# Patient Record
Sex: FEMALE | Race: WHITE | NOT HISPANIC OR LATINO | URBAN - METROPOLITAN AREA
[De-identification: names, ages, dates, MRNs, and addresses within clinical notes are randomized per-mention and may not be internally consistent; named-entity substitution may affect disease eponyms.]

---

## 2017-12-15 ENCOUNTER — INPATIENT (INPATIENT)
Facility: HOSPITAL | Age: 67
LOS: 3 days | Discharge: NURSING, REHAB OR LONG-TERM CARE | DRG: 86 | End: 2017-12-19
Attending: SURGERY | Admitting: SURGERY
Payer: MEDICARE

## 2017-12-15 VITALS — TEMPERATURE: 99 F | OXYGEN SATURATION: 98 % | RESPIRATION RATE: 16 BRPM | HEART RATE: 94 BPM

## 2017-12-15 LAB
APTT BLD: 37.1 SEC — SIGNIFICANT CHANGE UP (ref 27.5–37.4)
BASOPHILS # BLD AUTO: 0 K/UL — SIGNIFICANT CHANGE UP (ref 0–0.2)
BASOPHILS NFR BLD AUTO: 0.3 % — SIGNIFICANT CHANGE UP (ref 0–2)
EOSINOPHIL # BLD AUTO: 0 K/UL — SIGNIFICANT CHANGE UP (ref 0–0.5)
EOSINOPHIL NFR BLD AUTO: 0.6 % — SIGNIFICANT CHANGE UP (ref 0–6)
HCT VFR BLD CALC: 36.1 % — SIGNIFICANT CHANGE UP (ref 34.5–45)
HGB BLD-MCNC: 12.3 G/DL — SIGNIFICANT CHANGE UP (ref 11.5–15.5)
INR BLD: 1.65 RATIO — HIGH (ref 0.88–1.16)
LYMPHOCYTES # BLD AUTO: 1.3 K/UL — SIGNIFICANT CHANGE UP (ref 1–3.3)
LYMPHOCYTES # BLD AUTO: 24 % — SIGNIFICANT CHANGE UP (ref 13–44)
MCHC RBC-ENTMCNC: 32.9 PG — SIGNIFICANT CHANGE UP (ref 27–34)
MCHC RBC-ENTMCNC: 34 GM/DL — SIGNIFICANT CHANGE UP (ref 32–36)
MCV RBC AUTO: 96.8 FL — SIGNIFICANT CHANGE UP (ref 80–100)
MONOCYTES # BLD AUTO: 0.5 K/UL — SIGNIFICANT CHANGE UP (ref 0–0.9)
MONOCYTES NFR BLD AUTO: 9.7 % — SIGNIFICANT CHANGE UP (ref 2–14)
NEUTROPHILS # BLD AUTO: 3.6 K/UL — SIGNIFICANT CHANGE UP (ref 1.8–7.4)
NEUTROPHILS NFR BLD AUTO: 65.4 % — SIGNIFICANT CHANGE UP (ref 43–77)
PLATELET # BLD AUTO: 221 K/UL — SIGNIFICANT CHANGE UP (ref 150–400)
PROTHROM AB SERPL-ACNC: 18 SEC — HIGH (ref 9.8–12.7)
RBC # BLD: 3.73 M/UL — LOW (ref 3.8–5.2)
RBC # FLD: 11.2 % — SIGNIFICANT CHANGE UP (ref 10.3–14.5)
WBC # BLD: 5.5 K/UL — SIGNIFICANT CHANGE UP (ref 3.8–10.5)
WBC # FLD AUTO: 5.5 K/UL — SIGNIFICANT CHANGE UP (ref 3.8–10.5)

## 2017-12-15 PROCEDURE — 93010 ELECTROCARDIOGRAM REPORT: CPT

## 2017-12-15 PROCEDURE — 72125 CT NECK SPINE W/O DYE: CPT | Mod: 26

## 2017-12-15 PROCEDURE — 99285 EMERGENCY DEPT VISIT HI MDM: CPT | Mod: 25,GC

## 2017-12-15 PROCEDURE — 70450 CT HEAD/BRAIN W/O DYE: CPT | Mod: 26

## 2017-12-15 NOTE — ED ADULT NURSE NOTE - OBJECTIVE STATEMENT
67y female BIBEMS s/p fall. pt is alert and oriented x 3 with periods of confusion. Pt has hx of a-fib on eliquis and asa and dementia. As per EMS pt fell two times tonight while at the [facility] [facility]. Pt states she felt weak prior to fall. Pt states she hit her head, denies LOC. Hematoma felt on back of pt occiput. Pt denies ha, cp, sob, fevers, chills, n/v/d. Pt has no complaints in ER at this time. Pt attached to CM. MD jang completed. Pt in NAD. respirations equal and regular. Will reassess.

## 2017-12-15 NOTE — ED PROVIDER NOTE - MEDICAL DECISION MAKING DETAILS
Ron MCWILLIAMS: 66 y/o female with hx of Dementia and A fib on ASA and Eliquis here from NH after fall. Patient is A/OX3 but unable to provide a sound history. Patient reports she had a mechanical fall. NH reports fall was unwitnessed. Patient walks unassited at baseline. No melena, CP, palpitations, BRBPR or recent illness reported. Exam shows a female in NAD, perseverating, with clear lungs and abd soft and nontender and no midline lumbar or cervical posterior tenderness. Consider Cardiac arrhythmia, electrolyte abnormality, CAP, UTI, Mechanical fall. Also eval for ICH. Plan CBC, CMP, UA, CT head and neck and reassess.

## 2017-12-15 NOTE — ED PROVIDER NOTE - ATTENDING CONTRIBUTION TO CARE
Attending MD Jerez:  I personally have seen and examined this patient.  Resident note reviewed and agree on plan of care and except where noted.  See MDM for details.

## 2017-12-15 NOTE — ED PROVIDER NOTE - PROGRESS NOTE DETAILS
Colt MCWILLIAMS: Rhythm strip noted to have three events of nonsustained polymorphic V tach. Patient reassessed and reports no new symptoms. Cardiacs and electrolytes added. Another EKG to be ordered. Ron MCWILLIAMS: Bilateral SDH on CT head. Neurosurgery called and patient to be admitted. MD oMr: seen by nsx recommending repeat scan in AM, hold reversal of anticoagulation given exam. MD Mor: seen by brandon villegas recommending repeat scan in AM, hold reversal of anticoagulation given exam. message left for son Ron Irvin.

## 2017-12-16 DIAGNOSIS — Z90.89 ACQUIRED ABSENCE OF OTHER ORGANS: Chronic | ICD-10-CM

## 2017-12-16 DIAGNOSIS — Z90.49 ACQUIRED ABSENCE OF OTHER SPECIFIED PARTS OF DIGESTIVE TRACT: Chronic | ICD-10-CM

## 2017-12-16 DIAGNOSIS — I60.9 NONTRAUMATIC SUBARACHNOID HEMORRHAGE, UNSPECIFIED: ICD-10-CM

## 2017-12-16 LAB
ALBUMIN SERPL ELPH-MCNC: 4.1 G/DL — SIGNIFICANT CHANGE UP (ref 3.3–5)
ALP SERPL-CCNC: 57 U/L — SIGNIFICANT CHANGE UP (ref 40–120)
ALT FLD-CCNC: 15 U/L RC — SIGNIFICANT CHANGE UP (ref 10–45)
ANION GAP SERPL CALC-SCNC: 10 MMOL/L — SIGNIFICANT CHANGE UP (ref 5–17)
ANION GAP SERPL CALC-SCNC: 13 MMOL/L — SIGNIFICANT CHANGE UP (ref 5–17)
APTT BLD: 35.8 SEC — SIGNIFICANT CHANGE UP (ref 27.5–37.4)
AST SERPL-CCNC: 20 U/L — SIGNIFICANT CHANGE UP (ref 10–40)
BILIRUB SERPL-MCNC: 0.5 MG/DL — SIGNIFICANT CHANGE UP (ref 0.2–1.2)
BLD GP AB SCN SERPL QL: NEGATIVE — SIGNIFICANT CHANGE UP
BUN SERPL-MCNC: 22 MG/DL — SIGNIFICANT CHANGE UP (ref 7–23)
BUN SERPL-MCNC: 23 MG/DL — SIGNIFICANT CHANGE UP (ref 7–23)
CALCIUM SERPL-MCNC: 10 MG/DL — SIGNIFICANT CHANGE UP (ref 8.4–10.5)
CALCIUM SERPL-MCNC: 9.4 MG/DL — SIGNIFICANT CHANGE UP (ref 8.4–10.5)
CHLORIDE SERPL-SCNC: 106 MMOL/L — SIGNIFICANT CHANGE UP (ref 96–108)
CHLORIDE SERPL-SCNC: 108 MMOL/L — SIGNIFICANT CHANGE UP (ref 96–108)
CK MB BLD-MCNC: 1.5 % — SIGNIFICANT CHANGE UP (ref 0–3.5)
CK MB CFR SERPL CALC: 2.6 NG/ML — SIGNIFICANT CHANGE UP (ref 0–3.8)
CK SERPL-CCNC: 168 U/L — SIGNIFICANT CHANGE UP (ref 25–170)
CO2 SERPL-SCNC: 25 MMOL/L — SIGNIFICANT CHANGE UP (ref 22–31)
CO2 SERPL-SCNC: 25 MMOL/L — SIGNIFICANT CHANGE UP (ref 22–31)
CREAT SERPL-MCNC: 0.85 MG/DL — SIGNIFICANT CHANGE UP (ref 0.5–1.3)
CREAT SERPL-MCNC: 1.16 MG/DL — SIGNIFICANT CHANGE UP (ref 0.5–1.3)
GLUCOSE SERPL-MCNC: 117 MG/DL — HIGH (ref 70–99)
GLUCOSE SERPL-MCNC: 99 MG/DL — SIGNIFICANT CHANGE UP (ref 70–99)
HCT VFR BLD CALC: 35 % — SIGNIFICANT CHANGE UP (ref 34.5–45)
HGB BLD-MCNC: 11.8 G/DL — SIGNIFICANT CHANGE UP (ref 11.5–15.5)
INR BLD: 1.31 RATIO — HIGH (ref 0.88–1.16)
INR BLD: 1.33 RATIO — HIGH (ref 0.88–1.16)
MAGNESIUM SERPL-MCNC: 2.3 MG/DL — SIGNIFICANT CHANGE UP (ref 1.6–2.6)
MCHC RBC-ENTMCNC: 32.8 PG — SIGNIFICANT CHANGE UP (ref 27–34)
MCHC RBC-ENTMCNC: 33.8 GM/DL — SIGNIFICANT CHANGE UP (ref 32–36)
MCV RBC AUTO: 96.9 FL — SIGNIFICANT CHANGE UP (ref 80–100)
PHOSPHATE SERPL-MCNC: 3.3 MG/DL — SIGNIFICANT CHANGE UP (ref 2.5–4.5)
PLATELET # BLD AUTO: 202 K/UL — SIGNIFICANT CHANGE UP (ref 150–400)
POTASSIUM SERPL-MCNC: 4.2 MMOL/L — SIGNIFICANT CHANGE UP (ref 3.5–5.3)
POTASSIUM SERPL-MCNC: 4.3 MMOL/L — SIGNIFICANT CHANGE UP (ref 3.5–5.3)
POTASSIUM SERPL-SCNC: 4.2 MMOL/L — SIGNIFICANT CHANGE UP (ref 3.5–5.3)
POTASSIUM SERPL-SCNC: 4.3 MMOL/L — SIGNIFICANT CHANGE UP (ref 3.5–5.3)
PROT SERPL-MCNC: 7 G/DL — SIGNIFICANT CHANGE UP (ref 6–8.3)
PROTHROM AB SERPL-ACNC: 14.2 SEC — HIGH (ref 9.8–12.7)
PROTHROM AB SERPL-ACNC: 14.4 SEC — HIGH (ref 9.8–12.7)
RBC # BLD: 3.61 M/UL — LOW (ref 3.8–5.2)
RBC # FLD: 11.2 % — SIGNIFICANT CHANGE UP (ref 10.3–14.5)
RH IG SCN BLD-IMP: POSITIVE — SIGNIFICANT CHANGE UP
SODIUM SERPL-SCNC: 143 MMOL/L — SIGNIFICANT CHANGE UP (ref 135–145)
SODIUM SERPL-SCNC: 144 MMOL/L — SIGNIFICANT CHANGE UP (ref 135–145)
TROPONIN T SERPL-MCNC: <0.01 NG/ML — SIGNIFICANT CHANGE UP (ref 0–0.06)
TSH SERPL-MCNC: 2.87 UIU/ML — SIGNIFICANT CHANGE UP (ref 0.27–4.2)
WBC # BLD: 5.2 K/UL — SIGNIFICANT CHANGE UP (ref 3.8–10.5)
WBC # FLD AUTO: 5.2 K/UL — SIGNIFICANT CHANGE UP (ref 3.8–10.5)

## 2017-12-16 PROCEDURE — 99231 SBSQ HOSP IP/OBS SF/LOW 25: CPT

## 2017-12-16 PROCEDURE — 99222 1ST HOSP IP/OBS MODERATE 55: CPT

## 2017-12-16 PROCEDURE — 70450 CT HEAD/BRAIN W/O DYE: CPT | Mod: 26

## 2017-12-16 PROCEDURE — 99291 CRITICAL CARE FIRST HOUR: CPT

## 2017-12-16 RX ORDER — PROTHROMBIN COMPLEX CONCENTRATE (HUMAN) 25.5; 16.5; 24; 22; 22; 26 [IU]/ML; [IU]/ML; [IU]/ML; [IU]/ML; [IU]/ML; [IU]/ML
1000 POWDER, FOR SOLUTION INTRAVENOUS ONCE
Refills: 0 | Status: COMPLETED | OUTPATIENT
Start: 2017-12-16 | End: 2017-12-16

## 2017-12-16 RX ORDER — SODIUM CHLORIDE 9 MG/ML
1000 INJECTION INTRAMUSCULAR; INTRAVENOUS; SUBCUTANEOUS
Refills: 0 | Status: DISCONTINUED | OUTPATIENT
Start: 2017-12-16 | End: 2017-12-16

## 2017-12-16 RX ORDER — SODIUM CHLORIDE 9 MG/ML
500 INJECTION INTRAMUSCULAR; INTRAVENOUS; SUBCUTANEOUS ONCE
Refills: 0 | Status: COMPLETED | OUTPATIENT
Start: 2017-12-16 | End: 2017-12-16

## 2017-12-16 RX ORDER — SODIUM CHLORIDE 9 MG/ML
1000 INJECTION, SOLUTION INTRAVENOUS
Refills: 0 | Status: DISCONTINUED | OUTPATIENT
Start: 2017-12-16 | End: 2017-12-16

## 2017-12-16 RX ORDER — ACETAMINOPHEN 500 MG
650 TABLET ORAL EVERY 6 HOURS
Refills: 0 | Status: DISCONTINUED | OUTPATIENT
Start: 2017-12-16 | End: 2017-12-19

## 2017-12-16 RX ORDER — LEVETIRACETAM 250 MG/1
500 TABLET, FILM COATED ORAL EVERY 12 HOURS
Refills: 0 | Status: DISCONTINUED | OUTPATIENT
Start: 2017-12-16 | End: 2017-12-17

## 2017-12-16 RX ADMIN — PROTHROMBIN COMPLEX CONCENTRATE (HUMAN) 400 INTERNATIONAL UNIT(S): 25.5; 16.5; 24; 22; 22; 26 POWDER, FOR SOLUTION INTRAVENOUS at 04:21

## 2017-12-16 RX ADMIN — SODIUM CHLORIDE 500 MILLILITER(S): 9 INJECTION INTRAMUSCULAR; INTRAVENOUS; SUBCUTANEOUS at 00:09

## 2017-12-16 RX ADMIN — SODIUM CHLORIDE 75 MILLILITER(S): 9 INJECTION, SOLUTION INTRAVENOUS at 04:22

## 2017-12-16 RX ADMIN — SODIUM CHLORIDE 75 MILLILITER(S): 9 INJECTION INTRAMUSCULAR; INTRAVENOUS; SUBCUTANEOUS at 06:05

## 2017-12-16 RX ADMIN — LEVETIRACETAM 400 MILLIGRAM(S): 250 TABLET, FILM COATED ORAL at 06:05

## 2017-12-16 RX ADMIN — LEVETIRACETAM 400 MILLIGRAM(S): 250 TABLET, FILM COATED ORAL at 19:30

## 2017-12-16 NOTE — H&P ADULT - NSHPLABSRESULTS_GEN_ALL_CORE
CBC (12-15 @ 23:26)                              12.3                           5.5     )----------------(  221        65.4  % Neutrophils, 24.0  % Lymphocytes, ANC: 3.6                                 36.1      BMP (12-15 @ 23:26)             144     |  106     |  22    		Ca++ --      Ca 10.0               ---------------------------------( 117<H>		Mg 2.2                4.3     |  25      |  1.16  			Ph 3.5       LFTs (12-15 @ 23:26)      TPro 7.0 / Alb 4.1 / TBili 0.5 / DBili -- / AST 20 / ALT 15 / AlkPhos 57    Coags (12-15 @ 23:26)  aPTT 37.1 / INR 1.65<H> / PT 18.0<H>    Cardiac Markers (12-15 @ 23:26)     Trop: <0.01 -- / CKMB: -- / CK: 234      IMAGING:  CT Head No Cont (12.15.17 @ 23:29) >  Impression:   Head CT:  Small subarachnoid hemorrhage in the right frontotemporal   cortical sulci and left frontal cortical sulci. No displaced skull   fracture.   Cervical spine CT: Diffuse osteopenia without obvious fracture or   traumatic malalignment in the cervical spine. If clinically indicated,   MRI may be obtained for further evaluation.  Multilevel degenerative changes of the cervical spine.  Heterogeneous thyroid gland, which can be further characterized on a   nonemergent ultrasound.

## 2017-12-16 NOTE — H&P ADULT - ASSESSMENT
67F s/p unwitnessed fall with small SAH, nonsustained v. tach  -Admit to trauma under Dr. Singleton  -repeat CT in AM  -neurosurgery f/u  -SICU consulted, patient will need tele and monitoring for v. tach  -d/w Dr. Singleton

## 2017-12-16 NOTE — CONSULT NOTE ADULT - SUBJECTIVE AND OBJECTIVE BOX
HISTORY OF PRESENT ILLNESS:  KOBI BRADY is a 67y Female with dementia, a. fib (on Eliquis, ASA) presenting s/p unwitnessed fall. Patient states she was walking outside in her neighborhood and had a fall, reports she hit her head, ?LOC. Patient states she was able to ambulate at the scene. CT head revealed right sylvian  & left frontal traumatic SAH. Eliquis was reversed with 1000 units Kcentra.  In the ED the patient had nonsustained V. tach. She was transferred to SICU for monitoring.      PAST MEDICAL HISTORY: Atrial fibrillation on Eliquis, ASA  Dementia  No pertinent past medical history      PAST SURGICAL HISTORY: History of tonsillectomy  History of appendectomy      FAMILY HISTORY: unknown    SOCIAL HISTORY: unknown    CODE STATUS:  Full code    HOME MEDICATIONS: eliquis, ASA as per pt.     ALLERGIES: No Known Allergies    VITAL SIGNS:  ICU Vital Signs Last 24 Hrs  T(C): 36.5 (16 Dec 2017 05:20), Max: 37 (15 Dec 2017 22:22)  T(F): 97.7 (16 Dec 2017 05:20), Max: 98.6 (15 Dec 2017 22:22)  HR: 63 (16 Dec 2017 07:00) (63 - 94)  BP: 116/65 (16 Dec 2017 07:00) (102/70 - 131/52)  BP(mean): 85 (16 Dec 2017 07:00) (80 - 85)  RR: 22 (16 Dec 2017 07:00) (16 - 66)  SpO2: 100% (16 Dec 2017 07:00) (98% - 100%)    NEURO  Exam: confused, oriented to self, follows commands, no focal deficits  Meds:levETIRAcetam  IVPB 500 milliGRAM(s) IV Intermittent every 12 hours      RESPIRATORY  Exam: clear to auscultation bilaterally, unlabored  Meds: x    CARDIOVASCULAR  Exam: regular rate and rhythm  Cardiac Rhythm: sinus  Meds: x    GI/NUTRITION  Exam: soft, nontender, nondistended  Diet: NPO  Meds: x    GENITOURINARY/RENAL  Meds:sodium chloride 0.9%. 1000 milliLiter(s) IV Continuous <Continuous>      12-15 @ 07:01  -  12-16 @ 07:00  --------------------------------------------------------  IN:    IV PiggyBack: 100 mL    sodium chloride 0.9%.: 75 mL  Total IN: 175 mL    OUT:  Total OUT: 0 mL    Total NET: 175 mL      Weight (kg): 50 (12-16 @ 05:13)  12-16    143  |  108  |  23  ----------------------------<  99  4.2   |  25  |  0.85    Ca    9.4      16 Dec 2017 06:02  Phos  3.3     12-16  Mg     2.3     12-16    TPro  7.0  /  Alb  4.1  /  TBili  0.5  /  DBili  x   /  AST  20  /  ALT  15  /  AlkPhos  57  12-15    [n/a ] Thayer catheter, indication: n/a    HEMATOLOGIC  [- ] VTE Prophylaxis: none                          11.8   5.2   )-----------( 202      ( 16 Dec 2017 05:57 )             35.0     PT/INR - ( 16 Dec 2017 05:57 )   PT: 14.2 sec;   INR: 1.31 ratio         PTT - ( 16 Dec 2017 05:57 )  PTT:35.8 sec  Transfusion: [ ] PRBC	[ ] Platelets	[ ] FFP	[ ] Cryoprecipitate      INFECTIOUS DISEASES  Meds: x  RECENT CULTURES:      ENDOCRINE  Meds:x  CAPILLARY BLOOD GLUCOSE    PATIENT CARE ACCESS DEVICES:  [x ] Peripheral IV  [ ] Central Venous Line	[ ] R	[ ] L	[ ] IJ	[ ] Fem	[ ] SC	Placed:   [ ] Arterial Line		[ ] R	[ ] L	[ ] Fem	[ ] Rad	[ ] Ax	Placed:   [ ] PICC:					[ ] Mediport  [ ] Urinary Catheter, Date Placed:   [x] Necessity of urinary, arterial, and venous catheters discussed    OTHER MEDICATIONS: x    IMAGING STUDIES: < from: CT Head No Cont (12.15.17 @ 23:29) >    Impression:     Head CT:  Small subarachnoid hemorrhage in the right frontotemporal   cortical sulci and left frontal cortical sulci. No displaced skull   fracture.     Cervical spine CT: Diffuse osteopenia without obvious fracture or   traumatic malalignment in the cervical spine. If clinically indicated,   MRI may be obtained for further evaluation.    Multilevel degenerative changes of the cervical spine.    Heterogeneous thyroid gland, which can be further characterized on a   nonemergent ultrasound.

## 2017-12-16 NOTE — CONSULT NOTE ADULT - SUBJECTIVE AND OBJECTIVE BOX
HPI: 67F with afib on ASA and Eliquis s/p unwitnessed fall. Patient has dementia and is poor historian. Per report, patient fell multiple times at Nursing home. CT head shows right sylvian Subarachnoid hemorrhage and minimal left frontal traumatic Subarachnoid hemorrhage. Patient denies headache, nausea, vomiting, weakness, numbness, tingling.     Primary survey intact. GCS 15.  Secondary survey:  General: NAD  HEENT: No acute injuries  Neck: trachea midline  Chest: nontender, no step offs  Abdomen: Soft, nondistended, nontender  Pelvis: Stable  Back: no spinal tenderness, no step offs  Extremities: no acute injuries (16 Dec 2017 03:00)    PAST MEDICAL HISTORY   Atrial fibrillation  Dementia  No pertinent past medical history    PAST SURGICAL HISTORY   History of tonsillectomy  History of appendectomy    No Known Allergies      MEDICATIONS:  Antibiotics:    Neuro:    Anticoagulation: Eliquis and ASA    Other:  lactated ringers. 1000 milliLiter(s) IV Continuous <Continuous>      SOCIAL HISTORY:   Occupation:   Marital Status:     FAMILY HISTORY:      REVIEW OF SYSTEMS:  Check here if all are normal other than Neurological [x]  General:  Eyes:  ENT:  Cardiac:  Respiratory:  GI:  Musculoskeletal:   Skin:  Neurologic:   Psychiatric:     PHYSICAL EXAMINATION:   T(C): 36.7 (12-16-17 @ 03:35), Max: 37 (12-15-17 @ 22:22)  HR: 72 (12-16-17 @ 03:35) (72 - 94)  BP: 127/79 (12-16-17 @ 03:35) (102/70 - 127/79)  RR: 16 (12-16-17 @ 03:35) (16 - 17)  SpO2: 98% (12-16-17 @ 03:35) (98% - 98%)  Wt(kg): --  Weight (kg): 51.2 (12-16 @ 04:00)    AOx2-3, Following Commands    CN: PERRL, EOMI, V1-3 intact, no facial droop appreciated, hearing grossly intact, palate elevation symmetric, tongue midline, shoulder shrug 5/5    Motor: 5/5 throughout, no drift    Sensation: intact to light touch    Reflexes: No clonus or babinski    Gait: not assessed     LABS:                        12.3   5.5   )-----------( 221      ( 15 Dec 2017 23:26 )             36.1     12-15    144  |  106  |  22  ----------------------------<  117<H>  4.3   |  25  |  1.16    Ca    10.0      15 Dec 2017 23:26  Phos  3.5     12-15  Mg     2.2     12-15    TPro  7.0  /  Alb  4.1  /  TBili  0.5  /  DBili  x   /  AST  20  /  ALT  15  /  AlkPhos  57  12-15    PT/INR - ( 15 Dec 2017 23:26 )   PT: 18.0 sec;   INR: 1.65 ratio         PTT - ( 15 Dec 2017 23:26 )  PTT:37.1 sec      Pager: [phone]

## 2017-12-16 NOTE — H&P ADULT - HISTORY OF PRESENT ILLNESS
67F with dementia, a. fib (on Eliquis, ASA) presenting s/p unwitnessed fall. Patient states she was walking outside and had a fall, +headstrike, ?LOC. Patient states she was able to ambulate at the scene. In the ED the patient had nonsustained V. tach.    Primary survey intact. GCS 15.  Secondary survey:  General: NAD  HEENT: No acute injuries  Neck: trachea midline  Chest: nontender, no step offs  Abdomen: Soft, nondistended, nontender  Pelvis: Stable  Back: no spinal tenderness, no step offs  Extremities: no acute injuries

## 2017-12-16 NOTE — CONSULT NOTE ADULT - ATTENDING COMMENTS
bilateral SAH  -serial neuro exams  -repeat CT head today  -hold Eliquis and ASA    non-sustained ventricular tachycardia  -monitor rhythm in SICU

## 2017-12-16 NOTE — PROGRESS NOTE ADULT - SUBJECTIVE AND OBJECTIVE BOX
ATP Progress note    Interval events: Repeat CTH stable. Will resume DVT prophylaxis in 24h. NeuroSx recommending no surgical intervention currently.       Objective:   T(C): 36.6 (12-16-17 @ 15:00), Max: 37 (12-15-17 @ 22:22)  HR: 63 (12-16-17 @ 15:00) (62 - 94)  BP: 122/62 (12-16-17 @ 15:00) (102/70 - 131/52)  RR: 62 (12-16-17 @ 15:00) (15 - 66)  SpO2: 99% (12-16-17 @ 15:00) (97% - 100%)          12-15-17 @ 07:01  -  12-16-17 @ 07:00  --------------------------------------------------------  IN: 175 mL / OUT: 0 mL / NET: 175 mL    12-16-17 @ 07:01  -  12-16-17 @ 18:53  --------------------------------------------------------  IN: 525 mL / OUT: 450 mL / NET: 75 mL        LABS:                        11.8   5.2   )-----------( 202      ( 16 Dec 2017 05:57 )             35.0     12-16    143  |  108  |  23  ----------------------------<  99  4.2   |  25  |  0.85    Ca    9.4      16 Dec 2017 06:02  Phos  3.3     12-16  Mg     2.3     12-16    TPro  7.0  /  Alb  4.1  /  TBili  0.5  /  DBili  x   /  AST  20  /  ALT  15  /  AlkPhos  57  12-15    PT/INR - ( 16 Dec 2017 05:57 )   PT: 14.2 sec;   INR: 1.31 ratio         PTT - ( 16 Dec 2017 05:57 )  PTT:35.8 sec  CARDIAC MARKERS ( 16 Dec 2017 06:02 )  x     / <0.01 ng/mL / 168 U/L / x     / 2.6 ng/mL  CARDIAC MARKERS ( 15 Dec 2017 23:26 )  x     / <0.01 ng/mL / 234 U/L / x     / x            Physical Exam:   Neuro: confused, oriented to self, follows commands, no focal deficits  Resp: nonlabored breathing  Abd: soft, NT/ND

## 2017-12-16 NOTE — H&P ADULT - ATTENDING COMMENTS
67F s/p unwitnessed fall with small SAH, nonsustained v. tach    Neurosurgery consulted for head bleed  The INR is 1.6 sp recommending [facility]  patient has GCS of 15  will request ICU monitoring with head bleed, coagulopathy and V tach arrhythmia

## 2017-12-16 NOTE — H&P ADULT - NSHPREVIEWOFSYSTEMS_GEN_ALL_CORE
General: No fevers/chills, normal appetite  HEENT: No blurry or double vision, no runny nose  Respiratory: No cough, no shortness of breath  Cardiovascular: No palpitations, no chest pain  Gastrointestinal: No abdominal pain, nausea, emesis, constipation, diarrhea, melena  Genitourinary: No dysuria, no urinary frequency  Integumentary: No rashes  Psych: Normal interactions

## 2017-12-17 PROCEDURE — 99232 SBSQ HOSP IP/OBS MODERATE 35: CPT | Mod: 25

## 2017-12-17 RX ORDER — LEVETIRACETAM 250 MG/1
500 TABLET, FILM COATED ORAL EVERY 12 HOURS
Refills: 0 | Status: DISCONTINUED | OUTPATIENT
Start: 2017-12-17 | End: 2017-12-19

## 2017-12-17 RX ORDER — ENOXAPARIN SODIUM 100 MG/ML
40 INJECTION SUBCUTANEOUS DAILY
Refills: 0 | Status: DISCONTINUED | OUTPATIENT
Start: 2017-12-17 | End: 2017-12-17

## 2017-12-17 RX ORDER — DIVALPROEX SODIUM 500 MG/1
250 TABLET, DELAYED RELEASE ORAL DAILY
Refills: 0 | Status: DISCONTINUED | OUTPATIENT
Start: 2017-12-17 | End: 2017-12-19

## 2017-12-17 RX ORDER — MEMANTINE HYDROCHLORIDE 10 MG/1
10 TABLET ORAL
Refills: 0 | Status: DISCONTINUED | OUTPATIENT
Start: 2017-12-17 | End: 2017-12-19

## 2017-12-17 RX ORDER — ATORVASTATIN CALCIUM 80 MG/1
40 TABLET, FILM COATED ORAL AT BEDTIME
Refills: 0 | Status: DISCONTINUED | OUTPATIENT
Start: 2017-12-17 | End: 2017-12-19

## 2017-12-17 RX ORDER — ENOXAPARIN SODIUM 100 MG/ML
40 INJECTION SUBCUTANEOUS DAILY
Refills: 0 | Status: DISCONTINUED | OUTPATIENT
Start: 2017-12-17 | End: 2017-12-19

## 2017-12-17 RX ORDER — METOPROLOL TARTRATE 50 MG
12.5 TABLET ORAL EVERY 12 HOURS
Refills: 0 | Status: DISCONTINUED | OUTPATIENT
Start: 2017-12-17 | End: 2017-12-18

## 2017-12-17 RX ORDER — RIVASTIGMINE 4.6 MG/24H
1 PATCH, EXTENDED RELEASE TRANSDERMAL EVERY 24 HOURS
Refills: 0 | Status: DISCONTINUED | OUTPATIENT
Start: 2017-12-17 | End: 2017-12-19

## 2017-12-17 RX ADMIN — MEMANTINE HYDROCHLORIDE 10 MILLIGRAM(S): 10 TABLET ORAL at 18:37

## 2017-12-17 RX ADMIN — ATORVASTATIN CALCIUM 40 MILLIGRAM(S): 80 TABLET, FILM COATED ORAL at 21:03

## 2017-12-17 RX ADMIN — LEVETIRACETAM 500 MILLIGRAM(S): 250 TABLET, FILM COATED ORAL at 18:37

## 2017-12-17 RX ADMIN — RIVASTIGMINE 1 PATCH: 4.6 PATCH, EXTENDED RELEASE TRANSDERMAL at 18:37

## 2017-12-17 RX ADMIN — DIVALPROEX SODIUM 250 MILLIGRAM(S): 500 TABLET, DELAYED RELEASE ORAL at 18:38

## 2017-12-17 RX ADMIN — Medication 12.5 MILLIGRAM(S): at 18:38

## 2017-12-17 RX ADMIN — ENOXAPARIN SODIUM 40 MILLIGRAM(S): 100 INJECTION SUBCUTANEOUS at 18:41

## 2017-12-17 RX ADMIN — Medication 1 TABLET(S): at 18:37

## 2017-12-17 RX ADMIN — LEVETIRACETAM 400 MILLIGRAM(S): 250 TABLET, FILM COATED ORAL at 05:13

## 2017-12-17 NOTE — PROVIDER CONTACT NOTE (OTHER) - ASSESSMENT
A&Ox2 w/ periods of delirium and confusion. Obeys and follows appropriately to situation. VS stable. No signs of distress throughout shift.

## 2017-12-17 NOTE — PHYSICAL THERAPY INITIAL EVALUATION ADULT - DISCHARGE DISPOSITION, PT EVAL
Return to nursing care facility; PT services at facility for incr strengthening/balance training to decrease risk of falls

## 2017-12-17 NOTE — PHYSICAL THERAPY INITIAL EVALUATION ADULT - PRECAUTIONS/LIMITATIONS, REHAB EVAL
Denies headache, nausea, vomiting, weakness, numbness, tingling. Per NSX, No acute neurosurgical intervention indicated at this time. Patient had a repeat CT brain which showed no acute change.  Patient has remained pleasantly demented at her baseline. IMAGING:CT Head Impression: Small subarachnoid hemorrhage in the right frontotemporal cortical sulci and left frontal cortical sulci. No displaced skull fracture. Cervical spine CT: Diffuse osteopenia without obvious fracture or traumatic malalignment in the cervical spine. If clinically indicated, MRI may be obtained for further evaluation. Multilevel degenerative changes of the cervical spine. Heterogeneous thyroid gland, which can be further characterized on a nonemergent ultrasound./fall precautions

## 2017-12-17 NOTE — PROGRESS NOTE ADULT - SUBJECTIVE AND OBJECTIVE BOX
HISTORY OF PRESENT ILLNESS:  KOBI BRADY is a 67y Female with dementia, a. fib (on Eliquis, ASA) presenting s/p unwitnessed fall. Patient states she was walking outside in her neighborhood and had a fall, reports she hit her head, ?LOC. Patient states she was able to ambulate at the scene. CT head revealed right sylvian  & left frontal traumatic SAH. Eliquis was reversed with 1000 units Kcentra.  In the ED the patient had nonsustained V. tach. She was transferred to SICU for monitoring.      24 HOUR EVENTS:  Patient had a repeat CT brain which showed no acute change.  Patient has remained pleasantly demented at her baseline.      SUBJECTIVE/ROS:  [x] A ten-point review of systems was otherwise negative except as noted.  [ ] Due to altered mental status/intubation, subjective information were not able to be obtained from the patient. History was obtained, to the extent possible, from review of the chart and collateral sources of information.      NEURO  Exam: AAO x 2  Meds: acetaminophen   Tablet. 650 milliGRAM(s) Oral every 6 hours PRN Mild Pain (1 - 3)  levETIRAcetam  IVPB 500 milliGRAM(s) IV Intermittent every 12 hours    [x] Adequacy of sedation and pain control has been assessed and adjusted      RESPIRATORY  RR: 15 (12-17-17 @ 05:00) (14 - 66)  SpO2: 98% (12-17-17 @ 05:00) (96% - 100%)  Exam: unlabored, clear to auscultation bilaterally  Mechanical Ventilation:       CARDIOVASCULAR  HR: 53 (12-17-17 @ 05:00) (53 - 74)  BP: 133/62 (12-17-17 @ 05:00) (103/58 - 140/68)  BP(mean): 91 (12-17-17 @ 05:00) (74 - 98)      Exam:  Cardiac Rhythm:  Perfusion     [x]Adequate   [ ]Inadequate  Mentation   [x]Normal       [ ]Reduced  Extremities  [x]Warm         [ ]Cool  Volume Status [ ]Hypervolemic [ ]Euvolemic [ ]Hypovolemic  Meds:       GI/NUTRITION  Exam: soft, nontender, nondistended  Diet: Regular  Meds: x    GENITOURINARY  I&O's Detail    12-15 @ 07:01  -  12-16 @ 07:00  --------------------------------------------------------  IN:    IV PiggyBack: 100 mL    sodium chloride 0.9%: 75 mL  Total IN: 175 mL    OUT:  Total OUT: 0 mL    Total NET: 175 mL      12-16 @ 07:01  -  12-17 @ 05:21  --------------------------------------------------------  IN:    IV PiggyBack: 100 mL    sodium chloride 0.9%: 625 mL  Total IN: 725 mL    OUT:    Voided: 750 mL  Total OUT: 750 mL    Total NET: -25 mL          12-16    143  |  108  |  23  ----------------------------<  99  4.2   |  25  |  0.85    Ca    9.4      16 Dec 2017 06:02  Phos  3.3     12-16  Mg     2.3     12-16    TPro  7.0  /  Alb  4.1  /  TBili  0.5  /  DBili  x   /  AST  20  /  ALT  15  /  AlkPhos  57  12-15    [ ] Thayer catheter, indication: N/A  Meds:       HEMATOLOGIC  Meds:   [x] VTE Prophylaxis                        11.8   5.2   )-----------( 202      ( 16 Dec 2017 05:57 )             35.0     PT/INR - ( 16 Dec 2017 05:57 )   PT: 14.2 sec;   INR: 1.31 ratio         PTT - ( 16 Dec 2017 05:57 )  PTT:35.8 sec  Transfusion     [ ] PRBC   [ ] Platelets   [ ] FFP   [ ] Cryoprecipitate      INFECTIOUS DISEASES  T(C): 36.6 (12-17-17 @ 03:00), Max: 36.8 (12-16-17 @ 23:00)  WBC Count: 5.2 K/uL (12-16 @ 05:57)    Recent Cultures:none    Meds:       ENDOCRINE  Capillary Blood Glucose    Meds:     PATIENT CARE ACCESS DEVICES:  [x ] Peripheral IV  [ ] Central Venous Line	[ ] R	[ ] L	[ ] IJ	[ ] Fem	[ ] SC	Placed:   [ ] Arterial Line		[ ] R	[ ] L	[ ] Fem	[ ] Rad	[ ] Ax	Placed:   [ ] PICC:					[ ] Mediport  [ ] Urinary Catheter, Date Placed:   [x] Necessity of urinary, arterial, and venous catheters discussed    OTHER MEDICATIONS: x      CODE STATUS:     IMAGING:

## 2017-12-17 NOTE — PHYSICAL THERAPY INITIAL EVALUATION ADULT - PERTINENT HX OF CURRENT PROBLEM, REHAB EVAL
67F w/dementia, a. fib (on Eliquis, ASA) presenting s/p unwitnessed fall. Pt states she was walking outside & had a fall, +headstrike, ?LOC. Pt states she was able to ambulate at the scene. Per notes, pt w/ multiple falls at Nursing home. CT head shows right sylvian Subarachnoid hemorrhage and minimal left frontal traumatic Subarachnoid hemorrhage. Eliquis was reversed with 1000 units Kcentra.  In ED pt w/ nonsustained V. tach. She was transferred to SICU for monitoring.

## 2017-12-17 NOTE — PHYSICAL THERAPY INITIAL EVALUATION ADULT - TRANSFER SAFETY CONCERNS NOTED: SIT/STAND, REHAB EVAL
decreased balance during turns/decreased safety awareness/decreased sequencing ability/decreased weight-shifting ability

## 2017-12-18 PROCEDURE — 99233 SBSQ HOSP IP/OBS HIGH 50: CPT | Mod: GC

## 2017-12-18 PROCEDURE — 99233 SBSQ HOSP IP/OBS HIGH 50: CPT

## 2017-12-18 RX ORDER — METOPROLOL TARTRATE 50 MG
25 TABLET ORAL
Refills: 0 | Status: DISCONTINUED | OUTPATIENT
Start: 2017-12-18 | End: 2017-12-19

## 2017-12-18 RX ADMIN — Medication 1 TABLET(S): at 11:48

## 2017-12-18 RX ADMIN — LEVETIRACETAM 500 MILLIGRAM(S): 250 TABLET, FILM COATED ORAL at 05:14

## 2017-12-18 RX ADMIN — ATORVASTATIN CALCIUM 40 MILLIGRAM(S): 80 TABLET, FILM COATED ORAL at 22:58

## 2017-12-18 RX ADMIN — Medication 25 MILLIGRAM(S): at 17:55

## 2017-12-18 RX ADMIN — ENOXAPARIN SODIUM 40 MILLIGRAM(S): 100 INJECTION SUBCUTANEOUS at 11:48

## 2017-12-18 RX ADMIN — RIVASTIGMINE 1 PATCH: 4.6 PATCH, EXTENDED RELEASE TRANSDERMAL at 17:54

## 2017-12-18 RX ADMIN — MEMANTINE HYDROCHLORIDE 10 MILLIGRAM(S): 10 TABLET ORAL at 17:54

## 2017-12-18 RX ADMIN — LEVETIRACETAM 500 MILLIGRAM(S): 250 TABLET, FILM COATED ORAL at 17:55

## 2017-12-18 RX ADMIN — Medication 12.5 MILLIGRAM(S): at 05:14

## 2017-12-18 RX ADMIN — DIVALPROEX SODIUM 250 MILLIGRAM(S): 500 TABLET, DELAYED RELEASE ORAL at 11:48

## 2017-12-18 RX ADMIN — MEMANTINE HYDROCHLORIDE 10 MILLIGRAM(S): 10 TABLET ORAL at 05:14

## 2017-12-18 RX ADMIN — RIVASTIGMINE 1 PATCH: 4.6 PATCH, EXTENDED RELEASE TRANSDERMAL at 17:55

## 2017-12-18 NOTE — CONSULT NOTE ADULT - SUBJECTIVE AND OBJECTIVE BOX
CHIEF COMPLAINT:Patient is a 67y old  Female who presents with a chief complaint of s/p fall (16 Dec 2017 03:00)      HISTORY OF PRESENT ILLNESS:HPI:  67F with dementia, a. fib (on Eliquis, ASA) presenting s/p unwitnessed fall. Patient states she was walking outside and had a fall, +headstrike, ?LOC. Patient states she was able to ambulate at the scene. In the ED the patient had nonsustained V. tach.    Primary survey intact. GCS 15.  Secondary survey:  General: NAD  HEENT: No acute injuries  Neck: trachea midline  Chest: nontender, no step offs  Abdomen: Soft, nondistended, nontender  Pelvis: Stable  Back: no spinal tenderness, no step offs  Extremities: no acute injuries (16 Dec 2017 03:00)      PAST MEDICAL & SURGICAL HISTORY:  Atrial fibrillation  Dementia  History of tonsillectomy  History of appendectomy          MEDICATIONS:  enoxaparin Injectable 40 milliGRAM(s) SubCutaneous daily  metoprolol     tartrate 25 milliGRAM(s) Oral two times a day        acetaminophen   Tablet. 650 milliGRAM(s) Oral every 6 hours PRN  diVALproex  milliGRAM(s) Oral daily  levETIRAcetam 500 milliGRAM(s) Oral every 12 hours  memantine 10 milliGRAM(s) Oral two times a day  rivastigmine patch  9.5 mG/24 Hr(s) 1 Patch Transdermal every 24 hours      atorvastatin 40 milliGRAM(s) Oral at bedtime    multivitamin 1 Tablet(s) Oral daily      FAMILY HISTORY:      Non-contributory    SOCIAL HISTORY:    [ ] Tobacco  [ ] Drugs  [ ] Alcohol    Allergies    No Known Allergies    Intolerances    	    REVIEW OF SYSTEMS:  CONSTITUTIONAL: No fever  EYES: No eye pain, visual disturbances, or discharge  ENMT:  No difficulty hearing, tinnitus  NECK: No pain or stiffness  RESPIRATORY: No cough, wheezing,  CARDIOVASCULAR: No chest pain, palpitations, passing out, dizziness, or leg swelling  GASTROINTESTINAL:  No nausea, vomiting, diarrhea or constipation. No melena.  GENITOURINARY: No dysuria, hematuria  NEUROLOGICAL: No stroke like symptoms  SKIN: No burning or lesions   LYMPH Nodes: No enlarged glands  ENDOCRINE: No heat or cold intolerance  MUSCULOSKELETAL: No joint pain or swelling  PSYCHIATRIC: No  anxiety, mood swings  HEME/LYMPH: No bleeding gums  ALLERY AND IMMUNOLOGIC: No hives or eczema	    All other ROS negative    PHYSICAL EXAM:  T(C): 36.7 (12-18-17 @ 19:49), Max: 37.1 (12-18-17 @ 07:00)  HR: 70 (12-18-17 @ 19:49) (59 - 70)  BP: 110/71 (12-18-17 @ 19:49) (103/70 - 124/72)  RR: 18 (12-18-17 @ 19:49) (16 - 31)  SpO2: 96% (12-18-17 @ 19:49) (96% - 98%)  Wt(kg): --  I&O's Summary    17 Dec 2017 07:01  -  18 Dec 2017 07:00  --------------------------------------------------------  IN: 0 mL / OUT: 1250 mL / NET: -1250 mL    18 Dec 2017 07:01  -  18 Dec 2017 22:47  --------------------------------------------------------  IN: 720 mL / OUT: 420 mL / NET: 300 mL        Appearance: Normal	  HEENT:   Normal oral mucosa, EOMI	  Lymphatic: No lymphadenopathy  Cardiovascular: Normal S1 S2, No JVD, No murmurs, No edema  Respiratory: Lungs clear to auscultation	  Psychiatry: Alert, Mood & affect appropriate  Gastrointestinal:  Soft, Non-tender, + BS	  Skin: No rashes, No ecchymoses, No cyanosis	  Neurologic: Non-focal  Extremities:  No clubbing, cyanosis or edema  Vascular: Peripheral pulses palpable 2+ bilaterally  	    ECG:  	  RADIOLOGY:  	  	  CARDIAC MARKERS:  Troponin T, Serum: <0.01 ng/mL (12-16 @ 06:02)  Troponin T, Serum: <0.01 ng/mL (12-15 @ 23:26)        Assesment/Plan:   67F with dementia, a. fib (on Eliquis, ASA) presenting s/p unwitnessed fall. Patient states she was walking outside and had a fall, +headstrike, ?LOC. Patient states she was able to ambulate at the scene. In the ED the patient had nonsustained V. tach.  1. Reported WCT in ED - af aberrancy vs NSVT    2. AF - rate control  - no further AC given fall with head bleed    3. Dyslipidemia- c/w statin    4. SAH per Neurosurg    Pacheco Sy DO [facility]  [phone] CHIEF COMPLAINT:Patient is a 67y old  Female who presents with a chief complaint of s/p fall (16 Dec 2017 03:00)      HISTORY OF PRESENT ILLNESS:HPI:  67F with dementia, a. fib (on Eliquis, ASA) presenting s/p unwitnessed fall. Patient states she was walking outside and had a fall, +headstrike, ?LOC. Patient states she was able to ambulate at the scene. In the ED the patient had nonsustained V. tach.    Primary survey intact. GCS 15.  Secondary survey:  General: NAD  HEENT: No acute injuries  Neck: trachea midline  Chest: nontender, no step offs  Abdomen: Soft, nondistended, nontender  Pelvis: Stable  Back: no spinal tenderness, no step offs  Extremities: no acute injuries (16 Dec 2017 03:00)      PAST MEDICAL & SURGICAL HISTORY:  Atrial fibrillation  Dementia  History of tonsillectomy  History of appendectomy          MEDICATIONS:  enoxaparin Injectable 40 milliGRAM(s) SubCutaneous daily  metoprolol     tartrate 25 milliGRAM(s) Oral two times a day        acetaminophen   Tablet. 650 milliGRAM(s) Oral every 6 hours PRN  diVALproex  milliGRAM(s) Oral daily  levETIRAcetam 500 milliGRAM(s) Oral every 12 hours  memantine 10 milliGRAM(s) Oral two times a day  rivastigmine patch  9.5 mG/24 Hr(s) 1 Patch Transdermal every 24 hours      atorvastatin 40 milliGRAM(s) Oral at bedtime    multivitamin 1 Tablet(s) Oral daily      FAMILY HISTORY:      Non-contributory    SOCIAL HISTORY:    not a smoker    Allergies    No Known Allergies    Intolerances    	    REVIEW OF SYSTEMS:  CONSTITUTIONAL: No fever  EYES: No eye pain, visual disturbances, or discharge  ENMT:  No difficulty hearing, tinnitus  NECK: No pain or stiffness  RESPIRATORY: No cough, wheezing,  CARDIOVASCULAR: No chest pain, palpitations, passing out, dizziness, or leg swelling  GASTROINTESTINAL:  No nausea, vomiting, diarrhea or constipation. No melena.  GENITOURINARY: No dysuria, hematuria  NEUROLOGICAL: SAH  SKIN: No burning or lesions   LYMPH Nodes: No enlarged glands  ENDOCRINE: No heat or cold intolerance  MUSCULOSKELETAL: +joint pain  PSYCHIATRIC:  dementia  HEME/LYMPH: No bleeding gums  ALLERY AND IMMUNOLOGIC: No hives or eczema	    All other ROS negative    PHYSICAL EXAM:  T(C): 36.7 (12-18-17 @ 19:49), Max: 37.1 (12-18-17 @ 07:00)  HR: 70 (12-18-17 @ 19:49) (59 - 70)  BP: 110/71 (12-18-17 @ 19:49) (103/70 - 124/72)  RR: 18 (12-18-17 @ 19:49) (16 - 31)  SpO2: 96% (12-18-17 @ 19:49) (96% - 98%)  Wt(kg): --  I&O's Summary    17 Dec 2017 07:01  -  18 Dec 2017 07:00  --------------------------------------------------------  IN: 0 mL / OUT: 1250 mL / NET: -1250 mL    18 Dec 2017 07:01  -  18 Dec 2017 22:47  --------------------------------------------------------  IN: 720 mL / OUT: 420 mL / NET: 300 mL        Appearance: Normal	  HEENT:   Normal oral mucosa, EOMI	  Lymphatic: No lymphadenopathy  Cardiovascular: Normal S1 S2, No JVD, No murmurs, No edema  Respiratory: Lungs clear to auscultation	  Psychiatry: Alert, Mood & affect appropriate  Gastrointestinal:  Soft, Non-tender, + BS	  Skin: No rashes, No ecchymoses, No cyanosis	  Neurologic: Non-focal  Extremities:  No clubbing, cyanosis or edema  Vascular: Peripheral pulses palpable 2+ bilaterally  	    ECG:  nsr lvh	  RADIOLOGY: IMPRESSION:    Acute subarachnoid hemorrhage in the right frontal temporal and left  frontal sulci    	  CARDIAC MARKERS:  Troponin T, Serum: <0.01 ng/mL (12-16 @ 06:02)  Troponin T, Serum: <0.01 ng/mL (12-15 @ 23:26)        Assesment/Plan:   67F with dementia, a. fib (on Eliquis, ASA) presenting s/p unwitnessed fall. Patient states she was walking outside and had a fall, +headstrike, ?LOC. Patient states she was able to ambulate at the scene. In the ED the patient had nonsustained V. tach.  1. Reported WCT in ED - af aberrancy vs NSVT    2. AF - rate control  - no further AC given fall with head bleed    3. Dyslipidemia- c/w statin    4. SAH per Neurosurg    Pacheco Sy DO [facility]  [phone]

## 2017-12-18 NOTE — PROGRESS NOTE ADULT - SUBJECTIVE AND OBJECTIVE BOX
[facility] Progress Note - pager [phone]    Patient is a 67y old  Female who presents with a chief complaint of s/p fall (16 Dec 2017 03:00)      SUBJECTIVE: Patient seen and examined on morning rounds. No acute events overnight. Head CT scan unchanged. Vital signs stable. 24 hour urine output 1,250 ml. Patient is listed for telemetry bed. Dementia medications were restarted.      OBJECTIVE:     ** Physical Exam **  Neuro: No focal deficits  Resp: Nonlabored breathing  Abd: Soft, NT/ND    ** Vital signs / I&O's **    Vital Signs Last 24 Hrs  T(C): 37.1 (18 Dec 2017 07:00), Max: 37.1 (17 Dec 2017 19:15)  T(F): 98.7 (18 Dec 2017 07:00), Max: 98.8 (17 Dec 2017 19:15)  HR: 61 (18 Dec 2017 08:00) (59 - 85)  BP: 118/64 (18 Dec 2017 08:00) (108/64 - 140/63)  BP(mean): 86 (18 Dec 2017 08:00) (80 - 96)  RR: 31 (18 Dec 2017 08:00) (16 - 38)  SpO2: 97% (18 Dec 2017 08:00) (96% - 100%)      17 Dec 2017 07:01  -  18 Dec 2017 07:00  --------------------------------------------------------  IN:  Total IN: 0 mL    OUT:    Voided: 1250 mL  Total OUT: 1250 mL    Total NET: -1250 mL      18 Dec 2017 07:01  -  18 Dec 2017 10:42  --------------------------------------------------------  IN:  Total IN: 0 mL    OUT:    Voided: 220 mL  Total OUT: 220 mL    Total NET: -220 mL      MEDICATIONS  (STANDING):  atorvastatin 40 milliGRAM(s) Oral at bedtime  diVALproex  milliGRAM(s) Oral daily  enoxaparin Injectable 40 milliGRAM(s) SubCutaneous daily  levETIRAcetam 500 milliGRAM(s) Oral every 12 hours  memantine 10 milliGRAM(s) Oral two times a day  metoprolol     tartrate 12.5 milliGRAM(s) Oral every 12 hours  multivitamin 1 Tablet(s) Oral daily  rivastigmine patch  9.5 mG/24 Hr(s) 1 Patch Transdermal every 24 hours    MEDICATIONS  (PRN):  acetaminophen   Tablet. 650 milliGRAM(s) Oral every 6 hours PRN Mild Pain (1 - 3) [facility] Progress Note - pager [phone]    Patient is a 67y old  Female who presents with a chief complaint of s/p fall (16 Dec 2017 03:00)      SUBJECTIVE: Patient seen and examined on morning rounds. No acute events overnight. Head CT scan unchanged. Vital signs stable. 24 hour urine output 1,250 ml. Pt says that she has no headache or pain, no N/V, no dizziness (in context of fall with small B/L SAH, stable on CT 12/16).    10-pt ROS otherwise negative.    Patient is listed for telemetry bed. Dementia medications were restarted.      OBJECTIVE:     ** Physical Exam **  Gen: no distress  Neuro: No focal deficits  Resp: Nonlabored breathing  Abd: Soft, NT/ND  Psych: normal affect    ** Vital signs / I&O's **    Vital Signs Last 24 Hrs  T(C): 37.1 (18 Dec 2017 07:00), Max: 37.1 (17 Dec 2017 19:15)  T(F): 98.7 (18 Dec 2017 07:00), Max: 98.8 (17 Dec 2017 19:15)  HR: 61 (18 Dec 2017 08:00) (59 - 85)  BP: 118/64 (18 Dec 2017 08:00) (108/64 - 140/63)  BP(mean): 86 (18 Dec 2017 08:00) (80 - 96)  RR: 31 (18 Dec 2017 08:00) (16 - 38)  SpO2: 97% (18 Dec 2017 08:00) (96% - 100%)      17 Dec 2017 07:01  -  18 Dec 2017 07:00  --------------------------------------------------------  IN:  Total IN: 0 mL    OUT:    Voided: 1250 mL  Total OUT: 1250 mL    Total NET: -1250 mL      18 Dec 2017 07:01  -  18 Dec 2017 10:42  --------------------------------------------------------  IN:  Total IN: 0 mL    OUT:    Voided: 220 mL  Total OUT: 220 mL    Total NET: -220 mL      MEDICATIONS  (STANDING):  atorvastatin 40 milliGRAM(s) Oral at bedtime  diVALproex  milliGRAM(s) Oral daily  enoxaparin Injectable 40 milliGRAM(s) SubCutaneous daily  levETIRAcetam 500 milliGRAM(s) Oral every 12 hours  memantine 10 milliGRAM(s) Oral two times a day  metoprolol     tartrate 12.5 milliGRAM(s) Oral every 12 hours  multivitamin 1 Tablet(s) Oral daily  rivastigmine patch  9.5 mG/24 Hr(s) 1 Patch Transdermal every 24 hours    MEDICATIONS  (PRN):  acetaminophen   Tablet. 650 milliGRAM(s) Oral every 6 hours PRN Mild Pain (1 - 3)

## 2017-12-18 NOTE — PROGRESS NOTE ADULT - SUBJECTIVE AND OBJECTIVE BOX
24 hour events:    head CT stable, prophylactic lovenox started. Home medications discussed with assisted living facility and updated on med rec. Patient listed for telemetry bed.     History:   KOBI BRADY is a 67y Female with dementia, a. fib (on Eliquis, ASA) presenting s/p unwitnessed fall. Patient states she was walking outside in her neighborhood and had a fall, reports she hit her head, ?LOC. Patient states she was able to ambulate at the scene. CT head revealed right sylvian  & left frontal traumatic SAH. Eliquis was reversed with 1000 units Kcentra.  In the ED the patient had nonsustained V. tach. She was transferred to SICU for monitoring.

## 2017-12-18 NOTE — PROGRESS NOTE ADULT - SUBJECTIVE AND OBJECTIVE BOX
GENERAL SURGERY FLOOR TRANSFER NOTE    67y Female transferred to floor from SICU    SUBJECTIVE: No complaints at this time. Patient denies pain, nausea, or vomiting. Ambulating and voiding. Tolerating regular diet.    OBJECTIVE:  Gen: no distress  Neuro: No focal deficits  Resp: Nonlabored breathing  Abd: Soft, NT/ND  Psych: normal affect    T(C): 36.5 (12-18-17 @ 16:20), Max: 37.1 (12-17-17 @ 19:15)  HR: 69 (12-18-17 @ 16:20) (59 - 76)  BP: 103/70 (12-18-17 @ 16:20) (103/70 - 140/63)  RR: 18 (12-18-17 @ 16:20) (16 - 31)  SpO2: 97% (12-18-17 @ 16:20) (96% - 100%)  Wt(kg): --      I&O's Summary    17 Dec 2017 07:01  -  18 Dec 2017 07:00  --------------------------------------------------------  IN: 0 mL / OUT: 1250 mL / NET: -1250 mL    18 Dec 2017 07:01  -  18 Dec 2017 18:21  --------------------------------------------------------  IN: 160 mL / OUT: 420 mL / NET: -260 mL      MEDICATIONS  (STANDING):  atorvastatin 40 milliGRAM(s) Oral at bedtime  diVALproex  milliGRAM(s) Oral daily  enoxaparin Injectable 40 milliGRAM(s) SubCutaneous daily  levETIRAcetam 500 milliGRAM(s) Oral every 12 hours  memantine 10 milliGRAM(s) Oral two times a day  metoprolol     tartrate 25 milliGRAM(s) Oral two times a day  multivitamin 1 Tablet(s) Oral daily  rivastigmine patch  9.5 mG/24 Hr(s) 1 Patch Transdermal every 24 hours    MEDICATIONS  (PRN):  acetaminophen   Tablet. 650 milliGRAM(s) Oral every 6 hours PRN Mild Pain (1 - 3)        PHYSICAL EXAM:

## 2017-12-18 NOTE — PROGRESS NOTE ADULT - ATTENDING COMMENTS
Pt evaluated in AM round, continued to be evaluated  Traumatic ICH, pt awake non focal, continue seizure prophylaxis with keppra, f/u CT head  IVF NS  Mech DVT prophylaxis
Pt seen and examined on morning round.  Chart reviewed.  Resident note confirmed.  Pt is a 67 year old female with a medical history significant for atrial fib (on Eliquis) who presents to [facility]  s/p fall.  Pt sustained a right sylvian & left frontal traumatic SAH.  Pt’s INR was elevated and she received Kcentra.  Pt had an episode of  nonsustained Vtach in ED and was admitted to the SICU.  No acute events overnight.  Repeat CT head reveals stable SAH.  Pt continues on Keppra for seizure proph.  Cardiology consult for episode of Vtach.  B-blocker has been started by Dr. Montgomery.  Continue ISP for atelectasis.  Regular diet.  CKD 2 noted.  Culture for fever.  Hold DVT proph for SAH.  Pt is pending transfer to telemetry.  PT/OT eval.  Discharge planning.
I examined this patient on AM SICU rounds with the multidisciplinary team.  All relevant studies reviewed. Patient discussed with Dr. Montgomery (overnight SICU attending).  Issues:    Intracranial hemorrhage:  bilateral small traumatic SAH in trauma.  Stable on repeat CT.  Had been coagulopathic (on Eliquis) for chronic atrial fibrillation - stopped on presentation but will need to get input from cardiology and neurosurgery regarding the timing before anticoagulation can be reinstituted.  Appears to have had a bout of non-sustained ventricular tachycardia.  None while in unit (~48hours)    Dementia:  early onset.  Assisted living facility - no current behavioral disturbance.    Chronic kidney disease based on GFR 70 - stage 2.    Continue SICU care.
Traumatic head bleed, repeat HCT stable, pt neurologically intact, on seizure prophylaxis with keppra  h/o paroxysmal SVT, will add BB, cardiology consult called  PT  Pharmacological DVT prophylaxis  DC IVF, PO  PT
Pt seen and examined today at 10am, agree with above. B/L SAH after fall, stable on 12/16. On Keppra (7 days) for post-traumatic seizure prophylaxis. Non-sustained V-tach in ED, MI was ruled out. Will f/u Cards recs. D/w SICU team.

## 2017-12-19 ENCOUNTER — TRANSCRIPTION ENCOUNTER (OUTPATIENT)
Age: 67
End: 2017-12-19

## 2017-12-19 VITALS
RESPIRATION RATE: 18 BRPM | OXYGEN SATURATION: 95 % | HEART RATE: 72 BPM | TEMPERATURE: 98 F | SYSTOLIC BLOOD PRESSURE: 108 MMHG | DIASTOLIC BLOOD PRESSURE: 64 MMHG

## 2017-12-19 PROCEDURE — 80048 BASIC METABOLIC PNL TOTAL CA: CPT

## 2017-12-19 PROCEDURE — 85027 COMPLETE CBC AUTOMATED: CPT

## 2017-12-19 PROCEDURE — 86900 BLOOD TYPING SEROLOGIC ABO: CPT

## 2017-12-19 PROCEDURE — 82550 ASSAY OF CK (CPK): CPT

## 2017-12-19 PROCEDURE — 84100 ASSAY OF PHOSPHORUS: CPT

## 2017-12-19 PROCEDURE — 97161 PT EVAL LOW COMPLEX 20 MIN: CPT

## 2017-12-19 PROCEDURE — 86850 RBC ANTIBODY SCREEN: CPT

## 2017-12-19 PROCEDURE — 93005 ELECTROCARDIOGRAM TRACING: CPT

## 2017-12-19 PROCEDURE — 80053 COMPREHEN METABOLIC PANEL: CPT

## 2017-12-19 PROCEDURE — 82553 CREATINE MB FRACTION: CPT

## 2017-12-19 PROCEDURE — 99285 EMERGENCY DEPT VISIT HI MDM: CPT | Mod: 25

## 2017-12-19 PROCEDURE — 85730 THROMBOPLASTIN TIME PARTIAL: CPT

## 2017-12-19 PROCEDURE — 83735 ASSAY OF MAGNESIUM: CPT

## 2017-12-19 PROCEDURE — 72125 CT NECK SPINE W/O DYE: CPT

## 2017-12-19 PROCEDURE — 84443 ASSAY THYROID STIM HORMONE: CPT

## 2017-12-19 PROCEDURE — 70450 CT HEAD/BRAIN W/O DYE: CPT

## 2017-12-19 PROCEDURE — 86901 BLOOD TYPING SEROLOGIC RH(D): CPT

## 2017-12-19 PROCEDURE — 85610 PROTHROMBIN TIME: CPT

## 2017-12-19 PROCEDURE — 84484 ASSAY OF TROPONIN QUANT: CPT

## 2017-12-19 RX ORDER — ACETAMINOPHEN 500 MG
2 TABLET ORAL
Qty: 0 | Refills: 0 | DISCHARGE
Start: 2017-12-19

## 2017-12-19 RX ORDER — LEVETIRACETAM 250 MG/1
1 TABLET, FILM COATED ORAL
Qty: 10 | Refills: 0
Start: 2017-12-19 | End: 2017-12-23

## 2017-12-19 RX ADMIN — Medication 1 TABLET(S): at 12:09

## 2017-12-19 RX ADMIN — MEMANTINE HYDROCHLORIDE 10 MILLIGRAM(S): 10 TABLET ORAL at 07:23

## 2017-12-19 RX ADMIN — LEVETIRACETAM 500 MILLIGRAM(S): 250 TABLET, FILM COATED ORAL at 07:24

## 2017-12-19 RX ADMIN — ENOXAPARIN SODIUM 40 MILLIGRAM(S): 100 INJECTION SUBCUTANEOUS at 12:09

## 2017-12-19 RX ADMIN — LEVETIRACETAM 500 MILLIGRAM(S): 250 TABLET, FILM COATED ORAL at 17:00

## 2017-12-19 RX ADMIN — DIVALPROEX SODIUM 250 MILLIGRAM(S): 500 TABLET, DELAYED RELEASE ORAL at 12:09

## 2017-12-19 RX ADMIN — MEMANTINE HYDROCHLORIDE 10 MILLIGRAM(S): 10 TABLET ORAL at 17:00

## 2017-12-19 NOTE — DISCHARGE NOTE ADULT - CARE PROVIDER_API CALL
Quiana Trujillo), Neurosurgery  General  [address] [place] [address] Drive  [address] [place] [address]  Phone: [phone]  Fax: [phone]

## 2017-12-19 NOTE — CONSULT NOTE ADULT - CONSULT REASON
AF
medical management
traumatic SAH
s/p fall with right sylvian  & left frontal traumatic SAH, on Eliquis for a.fib s/p Kcentra

## 2017-12-19 NOTE — DISCHARGE NOTE ADULT - MEDICATION SUMMARY - MEDICATIONS TO TAKE
I will START or STAY ON the medications listed below when I get home from the hospital:    Florinef Acetate 0.1 mg oral tablet  -- 1 tab(s) by mouth 2 times a day  -- Indication: For Home med    acetaminophen 325 mg oral tablet  -- 2 tab(s) by mouth every 6 hours, As needed, Mild Pain (1 - 3)  -- Indication: For pain control    levETIRAcetam 500 mg oral tablet  -- 1 tab(s) by mouth every 12 hours  -- Indication: For Seizure ppx    divalproex sodium 250 mg oral tablet, extended release  -- 1 tab(s) by mouth once a day  -- Indication: For Home med    atorvastatin 40 mg oral tablet  -- 1 tab(s) by mouth once a day  -- Indication: For HLD    metoprolol succinate 25 mg oral tablet, extended release  -- 1 tab(s) by mouth once a day  -- Indication: For Arrythmia    Exelon 9.5 mg/24 hr transdermal film, extended release  -- 1 patch by transdermal patch once a day  -- Indication: For Home med    memantine 10 mg oral tablet  -- 1 tab(s) by mouth 2 times a day  -- Indication: For Dementia    Multiple Vitamins oral capsule  -- 1 cap(s) by mouth once a day  -- Indication: For Supplement

## 2017-12-19 NOTE — DISCHARGE NOTE ADULT - SECONDARY DIAGNOSIS.
Atrial fibrillation, unspecified type Dementia without behavioral disturbance, unspecified dementia type

## 2017-12-19 NOTE — DISCHARGE NOTE ADULT - OTHER SIGNIFICANT FINDINGS
CT Head No Cont (12.15.17 @ 23:29) >  	Impression:     Head CT:  Small subarachnoid hemorrhage in the right frontotemporal cortical sulci and left frontal cortical sulci. No displaced skull 	fracture.   	Cervical spine CT: Diffuse osteopenia without obvious fracture or 	traumatic malalignment in the cervical spine. If clinically indicated,   	MRI may be obtained for further evaluation.  	Multilevel degenerative changes of the cervical spine.  	Heterogeneous thyroid gland, which can be further characterized on a nonemergent ultrasound.

## 2017-12-19 NOTE — PROGRESS NOTE ADULT - ASSESSMENT
67Female with a.fib on Eliquis s/p fall with right sylvian & left frontal traumatic SAH s/p Kcentra, with nonsustained Vtach in ED.    Plan:  - Neuro: Continue Keppra, Repeat CTH stable  - Resp: encourage OOB to chair, IS  - GI: Regular diet   - Heme: resume DVT PPx in AM    Care per SICU team.
67Female with a.fib on Eliquis s/p fall with right sylvian & left frontal traumatic SAH s/p Kcentra, with nonsustained Vtach in ED.    Plan:  - Neuro: Continue Keppra, Repeat CTH stable  - Resp: encourage OOB to chair, IS  - GI: Regular diet   - F/u cardiology consult  - F/u neurosurgery recs  - Continue dementia meds  - Physical therapy
67Female with a.fib on Eliquis s/p fall with right sylvian & left frontal traumatic SAH s/p Kcentra, with nonsustained Vtach in ED.    Plan:  - Neuro: Continue Keppra, Repeat CTH stable  - Resp: encourage OOB to chair, IS  - GI: Regular diet   - Keppra 7 days for seizure ppx  - Continue dementia meds  - Physical therapy
67Female with a.fib on Eliquis s/p fall with right sylvian & left frontal traumatic SAH s/p Kcentra, with nonsustained Vtach in ED.    Plan:  - Neuro: Continue Keppra, Repeat CTH stable  - Resp: encourage OOB to chair, IS  - GI: Regular diet   - Keppra 7 days for seizure ppx  - Continue dementia meds  - Physical therapy
67Female with a.fib on Eliquis s/p fall with right sylvian & left frontal traumatic SAH s/p Kcentra, with nonsustained Vtach in ED. Currently stable.    PLAN:   Neurologic: dementia, right sylvian & left frontal traumatic SAH  - acetaminophen for pain control  - Keppra seizure prophylaxis  -home dementia medications restarted  - Neurosurgery following    Respiratory: no acute issues  - Incentive spirometry, OOB to chair to prevent atelectasis    Cardiovascular: a.fib on eliquis s/p kcentra; episode of nonsustained vtach  - Telemetry monitoring  - Med rec -> lopressor  - BP monitoring    Gastrointestinal/Nutrition: no acute issues  - tolerating refular diet     Genitourinary/Renal: no acute issues  - NS @ 75   - Monitor I&Os    Hematologic: on eliquis s/p kcentra  - Monitor coags  - Hold chemical VTE prophylaxis in setting of head bleed    Infectious Disease: no acute issues  - Check UA as possible cause of fall    Endocrine: no acute issues  - Monitor glucose on BMP    Disposition: Full code. SICU care
ASSESSMENT:  67Female with keke on Eliquis s/p fall with right sylvian & left frontal traumatic SAH s/p Kcentra, with nonsustained Vtach in ED. Currently stable.    PLAN:   Neurologic: dementia, right sylvian & left frontal traumatic SAH  - acetaminophen for pain control  - Keppra seizure prophylaxis  - q1 neuro checks  - Repeat CT head this AM  - Neurosurgery following    Respiratory: no acute issues  - Incentive spirometry, OOB to chair to prevent atelectasis    Cardiovascular: liseth.fib on eliquis s/p kcentra; episode of nonsustained vtach  - Telemetry monitoring  - Med rec to confirm if pt takes any meds for rate or rhythm control  - BP monitoring    Gastrointestinal/Nutrition: no acute issues  - Advance to regular diet after head CT    Genitourinary/Renal: no acute issues  - NS @ 75   - Monitor I&Os    Hematologic: on eliquis s/p kcentra  - Monitor coags  - Hold chemical VTE prophylaxis in setting of head bleed    Infectious Disease: no acute issues  - Check UA as possible cause of fall    Endocrine: no acute issues  - Monitor glucose on BMP    Disposition: Full code. SICU care.     Attempt to reach pt's family today, gather more information on PMH and med rec.

## 2017-12-19 NOTE — CONSULT NOTE ADULT - ASSESSMENT
ASSESSMENT: 67yFemale with a.fib on Eliquis s/p fall with right sylvian & left frontal traumatic SAH s/p Kcentra, with nonsustained Vtach in ED.    PLAN:   Neurologic: dementia, right sylvian & left frontal traumatic SAH  - Keppra seizure prophylaxis  - q1 neuro checks  - Repeat CT head this AM  - Neurosurgery following    Respiratory: no acute issues  - Incentive spirometry, OOB to chair to prevent atelectasis    Cardiovascular: a.fib on eliquis s/p kcentra; episode of nonsustained vtach  - Telemetry monitoring  - Med rec to confirm if pt takes any meds for rate or rhythm control  - BP monitoring    Gastrointestinal/Nutrition: no acute issues  - Advance to regular diet after head CT    Genitourinary/Renal: no acute issues  - NS @ 75   - Monitor I&Os    Hematologic: on eliquis s/p kcentra  - Monitor coags  - Hold chemical VTE prophylaxis in setting of head bleed    Infectious Disease: no acute issues  - Check UA as possible cause of fall    Endocrine: no acute issues  - Monitor glucose on BMP    Disposition: Full code. SICU care.     Attempt to reach pt's family today, gather more information on PMH and med rec.    Jaz Landa PA-C  [phone]
67F s/p fall likely mechanical with scattered traumatic SAH. Patient is on Eliquis and ASA for afib however no need for reversal at this time as patient is largely asymptomatic from hemorrhage.     -No acute neurosurgical intervention indicated at this time  -Repeat CT head in AM  -Neurochecks q4h  -Pain control  -trauma eval
67F with dementia, a. fib (   was on Eliquis, ASA) presenting s/p unwitnessed fall. Patient states she was walking outside and had a fall, +headstrike, ?LOC. Patient states she was able to ambulate at the scene. In the ED the patient had nonsustained V. tach. No surgery as per NS, cards and NS following    1 SAH  -stable  - NS fu  - PT  - No AC    2 Afib/ Vtach  - cards following  - no AC    PT and dc palnning

## 2017-12-19 NOTE — PROGRESS NOTE ADULT - SUBJECTIVE AND OBJECTIVE BOX
Patient is a 67y old  Female who presents with a chief complaint of s/p fall (16 Dec 2017 03:00)      INTERVAL HISTORY: No chest pain, SOB, paralysis, fevers, vomiting    	  MEDICATIONS:  metoprolol     tartrate 25 milliGRAM(s) Oral two times a day        PHYSICAL EXAM:  T(C): 36.7 (12-19-17 @ 07:10), Max: 37.1 (12-18-17 @ 11:00)  HR: 68 (12-19-17 @ 07:10) (62 - 70)  BP: 105/68 (12-19-17 @ 07:10) (103/70 - 126/75)  RR: 18 (12-19-17 @ 07:10) (18 - 28)  SpO2: 97% (12-19-17 @ 07:10) (96% - 98%)  Wt(kg): --  I&O's Summary    18 Dec 2017 07:01  -  19 Dec 2017 07:00  --------------------------------------------------------  IN: 720 mL / OUT: 820 mL / NET: -100 mL          Appearance: Normal	  HEENT:    PERRL, EOMI	  Lymphatic: No lymphadenopathy  Cardiovascular: Normal S1 S2, No JVD  Respiratory: Lungs clear to auscultation	  Psychiatry: Alert  Gastrointestinal:  Soft, Non-tender, + BS	  Skin: No rashes, No cyanosis  Extremities:  No edema of LE  Vascular: Peripheral pulses palpable  bilaterally      Labs, imaging and telemetry personally reviewed      ASSESSMENT/PLAN: 	  67F with dementia, a. fib (on Eliquis, ASA) presenting s/p unwitnessed fall. Patient states she was walking outside and had a fall, +headstrike, ?LOC. Patient states she was able to ambulate at the scene. In the ED the patient had nonsustained V. tach.  1. Reported WCT in ED - af aberrancy vs NSVT    2. AF - rate control  - no further AC given fall with head bleed    3. Dyslipidemia- c/w statin    4. SAH per Neurosurg    Discussed with primary team.         Pacheco Sy DO Located within [facility]  Cardiovascular Medicine  [phone] Patient is a 67y old  Female who presents with a chief complaint of s/p fall (16 Dec 2017 03:00)      INTERVAL HISTORY: No chest pain, SOB, paralysis, fevers, vomiting    	  MEDICATIONS:  metoprolol     tartrate 25 milliGRAM(s) Oral two times a day        PHYSICAL EXAM:  T(C): 36.7 (12-19-17 @ 07:10), Max: 37.1 (12-18-17 @ 11:00)  HR: 68 (12-19-17 @ 07:10) (62 - 70)  BP: 105/68 (12-19-17 @ 07:10) (103/70 - 126/75)  RR: 18 (12-19-17 @ 07:10) (18 - 28)  SpO2: 97% (12-19-17 @ 07:10) (96% - 98%)  Wt(kg): --  I&O's Summary    18 Dec 2017 07:01  -  19 Dec 2017 07:00  --------------------------------------------------------  IN: 720 mL / OUT: 820 mL / NET: -100 mL          Appearance: Normal	  HEENT:    PERRL, EOMI	  Lymphatic: No lymphadenopathy  Cardiovascular: Normal S1 S2, No JVD  Respiratory: Lungs clear to auscultation	  Psychiatry: Alert  Gastrointestinal:  Soft, Non-tender, + BS	  Skin: No rashes, No cyanosis  Extremities:  No edema of LE  Vascular: Peripheral pulses palpable  bilaterally      Labs, imaging and telemetry personally reviewed      ASSESSMENT/PLAN: 	  67F with dementia, a. fib (on Eliquis, ASA) presenting s/p unwitnessed fall. Patient states she was walking outside and had a fall, +headstrike, ?LOC. Patient states she was able to ambulate at the scene. In the ED the patient had nonsustained V. tach.  1. Reported  three events of nonsustained polymorphic V tach.ED no strips for analysis - af aberrancy vs NSVT  -no further episodes  - uptitrate beta blocker    2. AF - rate control with metoprolol  - no further AC given fall with head bleed    3. Dyslipidemia- c/w statin    4. SAH per Neurosurg    Discussed with primary team.         Pacheco Sy DO [facility]  Cardiovascular Medicine  [phone]

## 2017-12-19 NOTE — CONSULT NOTE ADULT - SUBJECTIVE AND OBJECTIVE BOX
cc fall  [place] 67F with dementia, a. fib (on Eliquis, ASA) presenting s/p unwitnessed fall. Patient states she was walking outside and had a fall, +headstrike, ?LOC. Patient states she was able to ambulate at the scene. In the ED the patient had nonsustained V. tach. No surgery as per NS, cards and NS following    Allergies NKDA    REVIEW OF SYSTEMS:    CONSTITUTIONAL: No weakness, fevers or chills  EYES/ENT: No visual changes;  No vertigo or throat pain   NECK: No pain or stiffness  RESPIRATORY: No cough, wheezing, hemoptysis; No shortness of breath  CARDIOVASCULAR: No chest pain or palpitations  GASTROINTESTINAL: No abdominal or epigastric pain. No nausea, vomiting, or hematemesis; No diarrhea or constipation. No melena or hematochezia.  GENITOURINARY: No dysuria, frequency or hematuria  NEUROLOGICAL: No numbness or weakness  SKIN: No itching, burning, rashes, or lesions   All other review of systems is negative unless indicated above.    MEDICATIONS:  enoxaparin Injectable 40 milliGRAM(s) SubCutaneous daily  metoprolol     tartrate 25 milliGRAM(s) Oral two times a day  acetaminophen   Tablet. 650 milliGRAM(s) Oral every 6 hours PRN  diVALproex  milliGRAM(s) Oral daily  levETIRAcetam 500 milliGRAM(s) Oral every 12 hours  memantine 10 milliGRAM(s) Oral two times a day  rivastigmine patch  9.5 mG/24 Hr(s) 1 Patch Transdermal every 24 hours  atorvastatin 40 milliGRAM(s) Oral at bedtime  multivitamin 1 Tablet(s) Oral daily    PMH   as per HPI    PSH  noncontributary    FAMILY HISTORY:  Non-contributory    SOCIAL HISTORY:    not a smoker    physical exam  General: WN/WD NAD  Neurology: A&Ox3, nonfocal, HANNAH x 4  Eyes: PERRLA/ EOMI, Gross vision intact  ENT/Neck: Neck supple, trachea midline, No JVD, Gross hearing intact  Respiratory: CTA B/L, No wheezing, rales, rhonchi  CV: RRR, S1S2, no murmurs, rubs or gallops  Abdominal: Soft, NT, ND +BS,   Extremities: No edema, + peripheral pulses  Skin: No Rashes, Hematoma, Ecchymosis  no edema      Lab Results: reviewed in sunrise  CBC    .		Differential:	[] Automated		[] Manual  Chemistry                      MICROBIOLOGY/CULTURES:      RADIOLOGY RESULTS:              Radiology personally reviewed.

## 2017-12-19 NOTE — DISCHARGE NOTE ADULT - PLAN OF CARE
Resolution of bleed Please follow up with Neurosurgery in 10 days.    Please stop taking your Asprin and Coumadin until further workup with Neurosurgery and Cardiology.

## 2017-12-19 NOTE — PROGRESS NOTE ADULT - SUBJECTIVE AND OBJECTIVE BOX
ATP Team Surgery Progress Note    SUBJECTIVE: No complaints at this time. Patient denies pain, nausea, or vomiting. Ambulating and voiding. Tolerating regular diet.      Vital Signs Last 24 Hrs  T(C): 36.9 (19 Dec 2017 08:00), Max: 36.9 (19 Dec 2017 08:00)  T(F): 98.4 (19 Dec 2017 08:00), Max: 98.4 (19 Dec 2017 08:00)  HR: 67 (19 Dec 2017 08:00) (64 - 70)  BP: 110/68 (19 Dec 2017 08:00) (103/70 - 126/75)  BP(mean): 78 (18 Dec 2017 15:00) (78 - 78)  RR: 18 (19 Dec 2017 08:00) (18 - 22)  SpO2: 95% (19 Dec 2017 08:00) (95% - 98%)    Physical Exam:  General Appearance: Appears well, NAD  Chest: Able to speak in full sentences, no labored breathing  CV: Pulse regular presently  Abdomen: Soft, nontense  Extremities: Grossly symmetric      INs and OUTs:    12-18-17 @ 07:01  -  12-19-17 @ 07:00  --------------------------------------------------------  IN: 720 mL / OUT: 820 mL / NET: -100 mL    12-19-17 @ 07:01  -  12-19-17 @ 14:16  --------------------------------------------------------  IN: 160 mL / OUT: 0 mL / NET: 160 mL

## 2017-12-19 NOTE — DISCHARGE NOTE ADULT - PATIENT PORTAL LINK FT
“You can access the [facility] Patient Portal, offered by [facility], by registering with the following website: [email or web address]”

## 2017-12-19 NOTE — DISCHARGE NOTE ADULT - CARE PLAN
Principal Discharge DX:	Subarachnoid bleed  Goal:	Resolution of bleed  Instructions for follow-up, activity and diet:	Please follow up with Neurosurgery in 10 days.    Please stop taking your Asprin and Coumadin until further workup with Neurosurgery and Cardiology.  Secondary Diagnosis:	Atrial fibrillation, unspecified type  Instructions for follow-up, activity and diet:	Please follow up with Neurosurgery in 10 days.    Please stop taking your Asprin and Coumadin until further workup with Neurosurgery and Cardiology.  Secondary Diagnosis:	Dementia without behavioral disturbance, unspecified dementia type

## 2017-12-19 NOTE — DISCHARGE NOTE ADULT - HOSPITAL COURSE
67F with dementia, A. fib (on Eliquis, ASA) s/p unwitnessed fall. Patient states she was walking outside and had a fall, +headstrike, ?LOC.  Patient states she was able to ambulate at the scene.  In the ED the patient had nonsustained V. tach. (MI was ruled out).  CT head revealed right sylvian  & left frontal traumatic SAH. Eliquis was reversed with 1000 units Kcentra. Patient was admitted to SICU for close observation (Traumatic ICH, pt awake non focal),  Neurosurgery was consulted, 24Hr Repeat imaging stable. Patient was clear for transfer to floor and evaluated by physical therapy who recommended home PT.   Cardiology was consulted for V Tach.   At the time of discharge, the patient was hemodynamically stable, was tolerating PO diet, was voiding urine, was ambulating w assistance, and was comfortable with adequate pain control. The patient was instructed to follow up with Dr. Singleton and Neurosurgery within 1-2 weeks after discharge from the hospital. The patient felt comfortable with discharge. The patient was discharged back to her Nursing Home with Home PT. The patient had no other issues.

## 2017-12-19 NOTE — DISCHARGE NOTE ADULT - MEDICATION SUMMARY - MEDICATIONS TO STOP TAKING
I will STOP taking the medications listed below when I get home from the hospital:    Eliquis 5 mg oral tablet  -- 1 tab(s) by mouth 2 times a day    aspirin 81 mg oral tablet  -- 1 tab(s) by mouth once a day

## 2018-03-13 PROBLEM — Z00.00 ENCOUNTER FOR PREVENTIVE HEALTH EXAMINATION: Status: ACTIVE | Noted: 2018-03-13

## 2020-07-21 NOTE — DISCHARGE NOTE ADULT - PROVIDER TOKENS
What Is The Reason For Today's Visit?: History of Non-Melanoma Skin Cancer How Many Skin Cancers Have You Had?: more than one Additional History: Last FBSE 1/2020 with AAM. When Was Your Last Cancer Diagnosed?: 2019 PAVITHRA:'[id number]'

## 2021-10-05 NOTE — PHYSICAL THERAPY INITIAL EVALUATION ADULT - NAME OF DISCHARGE PLANNER
Called patient and relayed below notes and orders. She verbalized agreement and understanding. Declined having refill of Furosemide sent and will call pharmacy when refill needed.        Via emailed CM/SW DC planning list.

## 2022-11-03 NOTE — ED PROVIDER NOTE - OBJECTIVE STATEMENT
Ron MCWILLIAMS: 68 y/o female with hx of Dementia and A fib on ASA and Eliquis here from NH after fall. Patient is A/OX3 but unable to provide a sound history. Patient reports she had a mechanical fall. NH reports fall was unwitnessed. Patient walks unassited at baseline. No melena, CP, palpitations, BRBPR or recent illness reported. Exam shows a female in NAD, perseverating, with clear lungs and abd soft and nontender and no midline lumbar or cervical posterior tenderness. Consider Cardiac arrhythmia, electrolyte abnormality, CAP, UTI, Mechanical fall. Also eval for ICH. Plan CBC, CMP, UA, CT head and neck and reassess.
English

## 2023-09-16 NOTE — PATIENT PROFILE ADULT. - HAS THE PATIENT HAD A SIGNIFICANT CHANGE IN FUNCTIONAL STATUS DUE TO CVA, HEAD TRAUMA, ORTHOPEDIC TRAUMA/SURGERY, OR FALL, WITH THE WEEK PRIOR TO ADMISSION
Last tsh done 12/19/2022  Last ov 12/16/2022  MWV due December 2023 please assist with appt  Last refill on med 03/24/2023 #90 with 1      Refilled per  protocol  
no

## 2023-09-22 RX ORDER — RIVASTIGMINE 4.6 MG/24H
1 PATCH, EXTENDED RELEASE TRANSDERMAL
Qty: 0 | Refills: 0 | DISCHARGE

## 2023-09-22 RX ORDER — MEMANTINE HYDROCHLORIDE 10 MG/1
1 TABLET ORAL
Qty: 0 | Refills: 0 | DISCHARGE

## 2023-09-22 RX ORDER — METOPROLOL TARTRATE 50 MG
1 TABLET ORAL
Qty: 0 | Refills: 0 | DISCHARGE

## 2023-09-22 RX ORDER — FLUDROCORTISONE ACETATE 0.1 MG/1
1 TABLET ORAL
Qty: 0 | Refills: 0 | DISCHARGE

## 2023-09-22 RX ORDER — ASPIRIN/CALCIUM CARB/MAGNESIUM 324 MG
1 TABLET ORAL
Qty: 0 | Refills: 0 | DISCHARGE

## 2023-09-22 RX ORDER — ATORVASTATIN CALCIUM 80 MG/1
1 TABLET, FILM COATED ORAL
Qty: 0 | Refills: 0 | DISCHARGE

## 2023-09-22 RX ORDER — DIVALPROEX SODIUM 500 MG/1
1 TABLET, DELAYED RELEASE ORAL
Qty: 0 | Refills: 0 | DISCHARGE

## 2023-09-22 RX ORDER — APIXABAN 2.5 MG/1
1 TABLET, FILM COATED ORAL
Qty: 0 | Refills: 0 | DISCHARGE
